# Patient Record
(demographics unavailable — no encounter records)

---

## 2025-07-14 NOTE — CONSULT LETTER
[Courtesy Letter:] : I had the pleasure of seeing your patient, [unfilled], in my office today. [Please see my note below.] : Please see my note below. [Consult Closing:] : Thank you very much for allowing me to participate in the care of this patient.  If you have any questions, please do not hesitate to contact me. [Sincerely,] : Sincerely, [Dear  ___] : Dear  [unfilled], [FreeTextEntry2] : [place] [address]  [phone] [FreeTextEntry3] : Phoenix Becerra MD Chief, Pediatric Otolaryngology [facility] and Isela Luis [facility] Professor of Otolaryngology [employer] at [facility]

## 2025-07-14 NOTE — ASSESSMENT
[FreeTextEntry1] : The child presents with a tongue tie. No tongue tie present on exam, patient will follow up as needed.

## 2025-07-14 NOTE — REASON FOR VISIT
[Initial Evaluation] : an initial evaluation for [Mother] : mother [FreeTextEntry2] : evaluate for tongue tie

## 2025-07-14 NOTE — PHYSICAL EXAM
[1+] : 1+ [Increased Work of Breathing] : no increased work of breathing with use of accessory muscles and retractions [Normal muscle strength, symmetry and tone of facial, head and neck musculature] : normal muscle strength, symmetry and tone of facial, head and neck musculature [Normal] : no cervical lymphadenopathy [de-identified] : no evidence of tongue tie. no evidence of lip tie.

## 2025-07-14 NOTE — HISTORY OF PRESENT ILLNESS
[No Personal or Family History of Easy Bruising, Bleeding, or Issues with General Anesthesia] : No Personal or Family History of easy bruising, bleeding, or issues with general anesthesia [de-identified] : Today I had the pleasure of seeing DORY TABOR. DORY is a 3 month girl who presents for: concern for tongue tie History was obtained from patient, parent and chart.   Mother feels she always has her tongue out Currently breast and bottle fed, no difficulty latching or maternal discomfort  Will latch and then come off a lot during feeds  Birthweight 8lbs  Current weight 15 lbs  Many wet and stool filled diapers No noisy breathing  No snoring  No chronic nasal congestion  Passed [facility]

## 2025-07-14 NOTE — PROCEDURE
[FreeTextEntry1] : Fiberoptic Laryngoscopy [FreeTextEntry2] : Dysphagia [FreeTextEntry3] : Patient is unable to cooperate for mirror exam. After informed verbal consent is obtained. The fiberoptic laryngoscope is passed via the right nasal cavity.  Findings: Base of tongue and vallecula are clear. The hypopharynx is clear with no lesions or masses and no evidence of pooled secretions. Crisp epiglottis. The vocal cords are clear intact, within normal limits and mobile bilaterally.  There is no significant arytenoid edema or erythema.